# Patient Record
Sex: MALE | Race: WHITE | NOT HISPANIC OR LATINO | Employment: OTHER | URBAN - METROPOLITAN AREA
[De-identification: names, ages, dates, MRNs, and addresses within clinical notes are randomized per-mention and may not be internally consistent; named-entity substitution may affect disease eponyms.]

---

## 2023-08-17 ENCOUNTER — OFFICE VISIT (OUTPATIENT)
Dept: CARDIOLOGY | Facility: CLINIC | Age: 53
End: 2023-08-17
Payer: COMMERCIAL

## 2023-08-17 VITALS
BODY MASS INDEX: 46.65 KG/M2 | SYSTOLIC BLOOD PRESSURE: 160 MMHG | WEIGHT: 315 LBS | DIASTOLIC BLOOD PRESSURE: 100 MMHG | OXYGEN SATURATION: 99 % | HEIGHT: 69 IN | HEART RATE: 61 BPM

## 2023-08-17 DIAGNOSIS — I10 ESSENTIAL HYPERTENSION: Primary | ICD-10-CM

## 2023-08-17 DIAGNOSIS — R73.9 HYPERGLYCEMIA: ICD-10-CM

## 2023-08-17 DIAGNOSIS — R07.9 EXERTIONAL CHEST PAIN: ICD-10-CM

## 2023-08-17 DIAGNOSIS — E78.5 HYPERLIPIDEMIA LDL GOAL <100: ICD-10-CM

## 2023-08-17 PROBLEM — Z72.0 TOBACCO USE: Status: ACTIVE | Noted: 2023-08-17

## 2023-08-17 PROBLEM — E66.01 MORBID OBESITY WITH BMI OF 45.0-49.9, ADULT: Status: ACTIVE | Noted: 2023-08-17

## 2023-08-17 RX ORDER — UBIDECARENONE 75 MG
50 CAPSULE ORAL DAILY
COMMUNITY

## 2023-08-17 RX ORDER — FUROSEMIDE 40 MG/1
40 TABLET ORAL DAILY
COMMUNITY
Start: 2023-07-19

## 2023-08-17 RX ORDER — ROSUVASTATIN CALCIUM 40 MG/1
40 TABLET, COATED ORAL NIGHTLY
COMMUNITY
Start: 2023-07-19

## 2023-08-17 RX ORDER — NITROGLYCERIN 0.4 MG/1
0.4 TABLET SUBLINGUAL
COMMUNITY
Start: 2023-08-01

## 2023-08-17 RX ORDER — METOPROLOL TARTRATE 37.5 MG/1
37.5 TABLET, FILM COATED ORAL 2 TIMES DAILY
COMMUNITY
Start: 2023-08-01

## 2023-08-17 RX ORDER — ALBUTEROL SULFATE 2.5 MG/3ML
2.5 SOLUTION RESPIRATORY (INHALATION) EVERY 4 HOURS PRN
COMMUNITY
Start: 2023-08-01

## 2023-08-17 RX ORDER — AMLODIPINE BESYLATE 10 MG/1
10 TABLET ORAL DAILY
Qty: 90 TABLET | Refills: 3 | Status: SHIPPED | OUTPATIENT
Start: 2023-08-17

## 2023-08-17 RX ORDER — LEVOTHYROXINE SODIUM 0.15 MG/1
150 TABLET ORAL DAILY
COMMUNITY
Start: 2023-07-19

## 2023-08-17 RX ORDER — ERGOCALCIFEROL 1.25 MG/1
50000 CAPSULE ORAL
COMMUNITY
Start: 2023-06-15

## 2023-08-17 RX ORDER — VALSARTAN 320 MG/1
320 TABLET ORAL DAILY
COMMUNITY
Start: 2023-08-01

## 2023-08-17 RX ORDER — MULTIPLE VITAMINS W/ MINERALS TAB 9MG-400MCG
1 TAB ORAL DAILY
COMMUNITY

## 2023-08-17 RX ORDER — POTASSIUM CHLORIDE 20 MEQ/1
20 TABLET, EXTENDED RELEASE ORAL DAILY
Qty: 30 TABLET | Refills: 11 | COMMUNITY
Start: 2022-12-16 | End: 2023-12-16

## 2023-08-17 NOTE — PATIENT INSTRUCTIONS
It was a pleasure seeing you in clinic today.     Medication changes: Amlodipine (Norvasc) 10mg once daily    Blood work ordered: Cholesterol panel, Diabetes marker study    Diagnostic studies ordered: Stress test

## 2023-08-17 NOTE — PROGRESS NOTES
New Patient     Name: Huey Petersen Jr.    : 1970     MRN: [id number]     DOS: 2023  Referred By: Lakisha Collins APRN    Chief Complaint  Chest Pain and Hypertension    Subjective     History of Present Illness:  Huey Petersen Jr. is a 53 y.o. male with history of hypertension, hyperlipidemia, tobacco use, and obesity who presents for evaluation of hypertension and chest pain.    Mr. Petersen presents today with his wife.  He describes at least a years long history of dealing with elevated blood pressure.  There have been changes in his medication throughout this time to try and control his blood pressure.  Most recently he was started on valsartan and is now on the max dose.  The past month though he has noted elevated blood pressure readings at home, upwards of 170 or higher on his systolic reading.     He also has complaints of chest pain.  This has been going on for about 3 to 4 months and mainly with exertion, although not every time.  It is located in the center of his chest and tends to resolve with rest.  He is never had these symptoms before.    Notably, patient was in the emergency room in 2022.  He presented with shortness of breath and lower extremity edema, at that time a CTPA was performed which was negative for PE.  An echocardiogram was performed which was normal (the records of which he has with him).  He was given IV Lasix with improvement in his symptoms and was started on p.o. Lasix at that time.    He also describes the need to frequently drink water with frequent urination as well.  He keeps track of his blood sugars at home on his wife's glucometer and typically has blood sugar readings of 140-150.    Patient continues to work.  He smokes 1 cigar/day (improved from prior).       Objective     Past Medical History:   Diagnosis Date    COPD (chronic obstructive pulmonary disease)     Heart failure     Hypertension     Thyroid disorder      History reviewed. No  "pertinent surgical history.  Family History   Problem Relation Age of Onset    Atrial fibrillation Mother     Stroke Mother     Thyroid disease Mother     Heart attack Father     Thyroid disease Sister      Social History     Socioeconomic History    Marital status:    Tobacco Use    Smoking status: Every Day     Types: Cigars    Smokeless tobacco: Never   Vaping Use    Vaping Use: Never used   Substance and Sexual Activity    Alcohol use: Never    Drug use: Never    Sexual activity: Yes     Partners: Female     Current Outpatient Medications on File Prior to Visit   Medication Sig Dispense Refill    albuterol (PROVENTIL) (2.5 MG/3ML) 0.083% nebulizer solution Take 2.5 mg by nebulization Every 4 (Four) Hours As Needed.      furosemide (LASIX) 40 MG tablet Take 1 tablet by mouth Daily.      levothyroxine (SYNTHROID, LEVOTHROID) 150 MCG tablet Take 1 tablet by mouth Daily.      Metoprolol Tartrate 37.5 MG tablet Take 37.5 mg by mouth 2 (Two) Times a Day.      multivitamin with minerals tablet tablet Take 1 tablet by mouth Daily.      nitroglycerin (NITROSTAT) 0.4 MG SL tablet Place 1 tablet under the tongue Every 5 (Five) Minutes As Needed.      potassium chloride (K-DUR,KLOR-CON) 20 MEQ CR tablet Take 1 tablet by mouth Daily. 30 tablet 11    rosuvastatin (CRESTOR) 40 MG tablet Take 1 tablet by mouth Every Night.      valsartan (DIOVAN) 320 MG tablet Take 1 tablet by mouth Daily.      vitamin B-12 (CYANOCOBALAMIN) 100 MCG tablet Take 0.5 tablets by mouth Daily.      vitamin D (ERGOCALCIFEROL) 1.25 MG (33036 UT) capsule capsule Take 1 capsule by mouth Every 7 (Seven) Days.       No current facility-administered medications on file prior to visit.         Vital Signs  /100 (BP Location: Right arm, Patient Position: Sitting, Cuff Size: Large Adult)   Pulse 61   Ht 175.3 cm (69\")   Wt (!) 153 kg (338 lb)   SpO2 99%   BMI 49.91 kg/mý   Estimated body mass index is 49.91 kg/mý as calculated from the " "following:    Height as of this encounter: 175.3 cm (69\").    Weight as of this encounter: 153 kg (338 lb).    Vitals and nursing note reviewed.   Constitutional:       Appearance: Healthy appearance. Not in distress.      Comments: Morbidly obese male   Eyes:      Conjunctiva/sclera: Conjunctivae normal.   HENT:    Mouth/Throat:      Dentition: Normal.      Pharynx: Oropharynx is clear.   Neck:      Vascular: JVD normal.   Pulmonary:      Effort: Pulmonary effort is normal.      Breath sounds: Normal breath sounds.   Cardiovascular:      PMI at left midclavicular line. Normal rate. Regular rhythm.      Murmurs: There is no murmur.      No gallop.  No click. No rub.   Pulses:     Intact distal pulses.   Edema:     Peripheral edema absent.   Musculoskeletal: Normal range of motion.      Cervical back: Normal range of motion and neck supple. Skin:     General: Skin is warm and dry.   Neurological:      Mental Status: Alert and oriented to person, place and time.     Last Echo (12/20/2022, outside records)     \"Conclusions: Left ventricle: The cavity size is normal.  Wall thickness is normal.  Systolic function is normal.  The estimated ejection fraction is 65-70%.  Wall motion is normal; there are no regional wall motion normalities.  Left ventricular diastolic function parameters are normal.\"        ECG 12 Lead    Date/Time: 8/17/2023 12:35 PM  Performed by: Kobe Eddy III, MD  Authorized by: Kobe Eddy III, MD   Previous ECG: no previous ECG available  Rhythm: sinus rhythm  Rate: normal  QRS axis: normal    Clinical impression: normal ECG        Assessment and Plan     Huey Petersen Jr. is a 53 y.o. male who presents today for the aforementioned problems.     Diagnoses and all orders for this visit:    1. Essential hypertension (Primary)  -     amLODIPine (NORVASC) 10 MG tablet; Take 1 tablet by mouth Daily.  Dispense: 90 tablet; Refill: 3    2. Exertional chest pain  -     Stress Test With Myocardial " Perfusion (2 Day); Future    3. Hyperlipidemia LDL goal <100  -     Lipid Panel; Future    4. Hyperglycemia  -     Hemoglobin A1c; Future    Other orders  -     ECG 12 Lead        This is a pleasant 53-year-old gentleman who presents for evaluation of hypertension and exertional chest pain.  His chest pain is new in onset the past 3 to 4 months, and is exertional in nature.  He has several risk factors for coronary disease including age, family history, hypertension and obesity.  Given his risk factors and the typical nature of his symptoms, we will proceed with treadmill myocardial perfusion.  We also discussed neck steps for normal and abnormal studies.  In the interim we have added amlodipine for antianginal therapy as well as for his hypertension.  His blood pressure today in clinic was quite high, and improved on recheck but remains elevated.  Therefore, as above, I have added amlodipine 10 mg daily to his regimen to control his blood pressure.  Given his concerning symptoms of polyuria and polydipsia, we will also evaluate for diabetes and further restratify with hemoglobin A1c and lipid panel.        Tobacco Use Counseling:  Huey Petersen Jr.  reports that he has been smoking cigars. He has never used smokeless tobacco.. I have educated him on the risk of diseases from using tobacco products such as heart disease.     Summary:  Medication Changes: Add amlodipine 10 mg daily  Diagnostic Studies Ordered: Treadmill perfusion stress test, hemoglobin A1c, lipid panel  Return to clinic: 3 months    Kobe Eddy MD  Interventional Cardiology  [facility]

## 2023-08-22 DIAGNOSIS — Z91.89 CHRONIC CHEST PAIN WITH LOW TO MODERATE RISK FOR CAD: Primary | ICD-10-CM

## 2023-08-22 DIAGNOSIS — G89.29 CHRONIC CHEST PAIN WITH LOW TO MODERATE RISK FOR CAD: Primary | ICD-10-CM

## 2023-08-22 DIAGNOSIS — R07.9 CHRONIC CHEST PAIN WITH LOW TO MODERATE RISK FOR CAD: Primary | ICD-10-CM

## 2023-08-30 ENCOUNTER — HOSPITAL ENCOUNTER (OUTPATIENT)
Dept: CARDIOLOGY | Facility: HOSPITAL | Age: 53
Discharge: HOME | End: 2023-08-30
Payer: COMMERCIAL